# Patient Record
Sex: MALE | Race: WHITE
[De-identification: names, ages, dates, MRNs, and addresses within clinical notes are randomized per-mention and may not be internally consistent; named-entity substitution may affect disease eponyms.]

---

## 2018-12-18 ENCOUNTER — HOSPITAL ENCOUNTER (OUTPATIENT)
Dept: HOSPITAL 31 - C.SDS | Age: 28
Discharge: HOME | End: 2018-12-18
Attending: SURGERY
Payer: MEDICARE

## 2018-12-18 VITALS — TEMPERATURE: 97.7 F | DIASTOLIC BLOOD PRESSURE: 88 MMHG | SYSTOLIC BLOOD PRESSURE: 124 MMHG | HEART RATE: 83 BPM

## 2018-12-18 VITALS — RESPIRATION RATE: 18 BRPM

## 2018-12-18 VITALS — BODY MASS INDEX: 27.1 KG/M2

## 2018-12-18 VITALS — OXYGEN SATURATION: 100 %

## 2018-12-18 DIAGNOSIS — N18.6: Primary | ICD-10-CM

## 2018-12-18 DIAGNOSIS — Z94.0: ICD-10-CM

## 2018-12-18 PROCEDURE — 37607 LIG/BANDING ANGIOACS AV FSTL: CPT

## 2018-12-18 NOTE — [id number].[id number]
Surgeon's Initial Post Op Note





- Surgeon's Notes


Surgeon: Dr. Walton


Assistant: Dr. Sheffield


Type of Anesthesia: General LMA


Pre-Operative Diagnosis: AVF anuerismal dilation


Operative Findings: See operative dictation


Post-Operative Diagnosis: Same


Operation Performed: Revision of AVF


Specimen/Specimens Removed: None


Estimated Blood Loss: EBL {In ML}: 20


Blood Products Given: N/A


Drains Used: No Drains


Post-Op Condition: Good


Date of Surgery/Procedure: 12/18/18


Time of Surgery/Procedure: 14:00

## 2018-12-19 NOTE — OP
PROCEDURE DATE:  12/18/2018



PREOPERATIVE DIAGNOSIS:  Aneurysmal dilatation of left arm arteriovenous

fistula, status post kidney transplant.



PROCEDURE CARRIED OUT:  Revision of arteriovenous fistula with ligation of

forearm and upper arm vein.



SURGEON:  Martín Walton Jr., MD



ASSISTANT:  Abraham Sheffield DO



ANESTHESIOLOGIST:  Mr. Pato CRNA



INDICATIONS FOR PROCEDURE:  The patient is a young man with renal

insufficiency who received a transplant.  He is doing quite well.  He has a

fistula in his arm, which is becoming aneurysmal.  He discussed with his

kidney transplant group the options he has.  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  He requested that the

fistula be ligated.  He is well aware of the fact that he may need dialysis

in the future and that we will have to start from square one.  It was also

cleared to him before we began the operation that the bumps or the dilated

portion of the veins may remain and may require further evacuation or

removal.



DESCRIPTION OF PROCEDURE:  The patient was given general anesthesia and

intravenous antibiotics.  A tourniquet was applied to the arm and

non-inflated.  The proximal and distal branches were dissected free after

this had been done.  We then ligated both with silk sutures.  This resulted

in deflation of the veins on both sides of the fistula anastomosis.  After

this had been carried out satisfactorily, the wound was closed with layer

closures and 5-0 nylon sutures on the skin.



ESTIMATED BLOOD LOSS:  Blood loss in the procedure was less than 25 mL.



OPERATION CARRIED OUT:  Revision of arteriovenous fistula with ligation of

same..







__________________________________________

Martín Walton Jr., MD





cc:  Kristine Snider MD



DD:  12/18/2018 14:07:24

DT:  12/18/2018 15:53:01

Job # [id number]